# Patient Record
Sex: MALE | Race: WHITE | URBAN - METROPOLITAN AREA
[De-identification: names, ages, dates, MRNs, and addresses within clinical notes are randomized per-mention and may not be internally consistent; named-entity substitution may affect disease eponyms.]

---

## 2021-03-29 ENCOUNTER — TRANSFERRED RECORDS (OUTPATIENT)
Dept: HEALTH INFORMATION MANAGEMENT | Facility: CLINIC | Age: 61
End: 2021-03-29

## 2021-03-29 LAB
CREAT SERPL-MCNC: 0.73 MG/DL (ref 0.57–1.11)
GFR SERPL CREATININE-BSD FRML MDRD: >60 ML/MIN/1.73M2
GLUCOSE SERPL-MCNC: 84 MG/DL (ref 65–100)
POTASSIUM SERPL-SCNC: 3.8 MMOL/L (ref 3.5–5)

## 2021-03-31 ENCOUNTER — TRANSFERRED RECORDS (OUTPATIENT)
Dept: HEALTH INFORMATION MANAGEMENT | Facility: CLINIC | Age: 61
End: 2021-03-31

## 2021-04-01 ENCOUNTER — TRANSFERRED RECORDS (OUTPATIENT)
Dept: HEALTH INFORMATION MANAGEMENT | Facility: CLINIC | Age: 61
End: 2021-04-01

## 2021-04-02 ENCOUNTER — TELEPHONE (OUTPATIENT)
Dept: OTOLARYNGOLOGY | Facility: CLINIC | Age: 61
End: 2021-04-02
Payer: COMMERCIAL

## 2021-04-02 ENCOUNTER — TRANSCRIBE ORDERS (OUTPATIENT)
Dept: OTHER | Age: 61
End: 2021-04-02

## 2021-04-02 DIAGNOSIS — J39.2 PHARYNGEAL MASS: Primary | ICD-10-CM

## 2021-04-02 NOTE — TELEPHONE ENCOUNTER
[facility] Call Center    Phone Message    May a detailed message be left on voicemail: no     Reason for Call: Appointment Intake    Referring Provider Name: Dr. Isidro Walls at [facility] to Head & Neck Cancer surgeon  Diagnosis and/or Symptoms: Left Pharyngeal Mass    Patient will hand carry recent CT imaging    Action Taken: Message routed to:  [facility] ([facility]): [facility] [[id number]]    Travel Screening: Not Applicable

## 2021-04-05 ENCOUNTER — TELEPHONE (OUTPATIENT)
Dept: OTOLARYNGOLOGY | Facility: CLINIC | Age: 61
End: 2021-04-05

## 2021-04-05 NOTE — TELEPHONE ENCOUNTER
Records Requested  04/05/21    [facility] imaging   Fax [phone]   Outcome Sent a fax for 3/31/2021 CT Neck images, report already scanned in EPIC   4/6/2021 11AM images received in PACS - amay       Records Requested  04/05/21    [facility] / [facility]    Outcome Sent a req for ENT notes    4/6/2021 12:16PM spoke with patient, he has provided verbal consent to access his care everywhere recs during this time. - Amay

## 2021-04-06 NOTE — TELEPHONE ENCOUNTER
FUTURE VISIT INFORMATION      FUTURE VISIT INFORMATION:    Date: 4/14/2021    Time: 1:20PM    Location: [facility]  REFERRAL INFORMATION:    Referring provider:  Dr Isidro Walls     Referring providers clinic:      Reason for visit/diagnosis  Head & Neck Cancer surgeon for Left Pharyngeal Mass, referred by Dr. Isidro Walls at [facility]    RECORDS REQUESTED FROM:       Clinic name Comments Records Status Imaging Status   [facility] imaging  3/31/2021 CT Neck Scanned in Epic PACS   [facility]   04/01/2021 note from Isidro Walls, DO   Care Everywhere     [facility]  03/29/2021 note from Epifanio Tena, DO  Care everywhere                   4/6/2021 12PM sent a 2nd fax to [facility] for recs - Amay   *called patient was able to get verbal consent from patient to access his care everywhere records from [facility]. Also notified patient that we do not need him to hand carry his images to the appointment - Amay
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
## 2021-04-14 ENCOUNTER — OFFICE VISIT (OUTPATIENT)
Dept: OTOLARYNGOLOGY | Facility: CLINIC | Age: 61
End: 2021-04-14
Payer: COMMERCIAL

## 2021-04-14 ENCOUNTER — PRE VISIT (OUTPATIENT)
Dept: OTOLARYNGOLOGY | Facility: CLINIC | Age: 61
End: 2021-04-14

## 2021-04-14 VITALS — BODY MASS INDEX: 39.65 KG/M2 | WEIGHT: 277 LBS | HEIGHT: 70 IN

## 2021-04-14 DIAGNOSIS — J39.2 PHARYNGEAL LESION: Primary | ICD-10-CM

## 2021-04-14 DIAGNOSIS — J39.2 PHARYNGEAL MASS: ICD-10-CM

## 2021-04-14 PROCEDURE — 99203 OFFICE O/P NEW LOW 30 MIN: CPT | Mod: 25 | Performed by: OTOLARYNGOLOGY

## 2021-04-14 PROCEDURE — 31575 DIAGNOSTIC LARYNGOSCOPY: CPT | Performed by: OTOLARYNGOLOGY

## 2021-04-14 RX ORDER — GINKGO BILOBA LEAF EXTRACT 60 MG
1 CAPSULE ORAL
COMMUNITY
Start: 2019-08-18

## 2021-04-14 RX ORDER — MULTIVITAMIN
TABLET ORAL
COMMUNITY

## 2021-04-14 RX ORDER — FLUTICASONE PROPIONATE 50 MCG
SPRAY, SUSPENSION (ML) NASAL
COMMUNITY
Start: 2019-08-18

## 2021-04-14 ASSESSMENT — MIFFLIN-ST. JEOR: SCORE: 2077.71

## 2021-04-14 ASSESSMENT — PAIN SCALES - GENERAL: PAINLEVEL: NO PAIN (1)

## 2021-04-14 NOTE — NURSING NOTE
"Chief Complaint   Patient presents with     Consult       Height 1.778 m (5' 10\"), weight 125.6 kg (277 lb).    Cuca Graham, EMT    "
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
## 2021-04-16 ENCOUNTER — TUMOR CONFERENCE (OUTPATIENT)
Dept: ONCOLOGY | Facility: CLINIC | Age: 61
End: 2021-04-16
Payer: COMMERCIAL

## 2021-04-16 DIAGNOSIS — R07.0 THROAT DISCOMFORT: Primary | ICD-10-CM

## 2021-04-16 NOTE — PROGRESS NOTES
Called and spoke with patient with tumor board discussion and recommendation for repeat CT scan in 3 months with follow-up with Dr. Slater. Patient in agreement with plan and was encouraged to call sooner with any further questions or concerns.     Schedulers will call patient to arrange follow-up and CT.    Adelina Mcgraw, RN, BSN

## 2021-04-16 NOTE — PROGRESS NOTES
"Head & Neck Tumor Conference Note        Status: New   Staff: Dr. Slater     Tumor Site: None   Tumor Pathology: Unknown  Tumor Stage: None   Tumor Treatment: None     Reason for Review: Review imaging, and POC    Brief History: Chance Barr is a 60 year old man referred for evaluation of throat pain. The patient has a several week history of left throat pain. He has pain that extends in the lateral neck, along the jaw, into the shoulder. He had a CT neck performed which showed \"thickening of the oropharyngeal mucosa, consistent with a history of chronic pharyngitis\". He saw Dr Walls on 4/1/2021 with no obvious findings. On exam in ENT clinic he had symmetrically enlarged tonsils but no obvious lesions. Imaging demonstrated a prominent level II node. He is being presented today for review of imaging and discussion on the need for biopsy.     Pertinent PMH: No past medical history on file.     Smoking Hx:   Social History     Tobacco Use     Smoking status: Not on file   Substance Use Topics     Alcohol use: Not on file     Drug use: Not on file     Imaging:   CT neck 3/31/21   FINDINGS:     There is thickening of the oropharyngeal mucosa, consistent with the history of chronic pharyngitis. The airway remains patent.    There is no evidence of abscess in the neck.    There are scattered cervical lymph nodes, likely reactive.     The visualized intracranial contents are unremarkable.    The visualized lung apices are unremarkable.    The thyroid gland is unremarkable.    The cervical spine is unremarkable.     IMPRESSION:    Thickening of the oropharyngeal mucosa is consistent with the history of chronic pharyngitis. The airway remains patent.  Please note that all CT scans at this facility use dose modulation, iterative reconstruction, and/or weight-based dosing when appropriate to reduce radiation dose to as low as reasonably achievable.    Pathology:   None     Tumor Board Recommendation:   Discussion: This patients " imaging was reviewed at conference today. His CT scan demonstrates bilateral tonsil enlargement favoring a reactive process. There are a few nodes in the neck are non concerning at this time. It was the recommendation of the tumor board for follow up in 3 months with repeat CT.     Tru Gaspar MD PGY-3  Otolaryngology- Head and Neck Surgery    Documentation / Disclaimer Cancer Tumor Board Note  Cancer tumor board recommendations do not override what is determined to be reasonable care and treatment, which is dependent on the circumstances of a patient's case; the patient's medical, social, and personal concerns; and the clinical judgment of the oncologist [physician].

## 2021-04-19 ENCOUNTER — TELEPHONE (OUTPATIENT)
Dept: OTOLARYNGOLOGY | Facility: CLINIC | Age: 61
End: 2021-04-19

## 2021-07-18 NOTE — PROGRESS NOTES
"Dear Dr. Walls:    I had the pleasure of seeing Chance Barr in follow-up today at the [facility].     History of Present Illness:   Chance Barr is a 60 year old man who was referred in April 2021 for evaluation of throat pain. The patient had a several week history of left throat pain that extended into the lateral neck, along the jaw, into the shoulder. He had a CT neck performed which showed \"thickening of the oropharyngeal mucosa, consistent with a history of chronic pharyngitis\". He saw Dr Walls on 4/1/2021 with no obvious findings. On exam at his initial consult he had no obvious lesions but imaging did show a prominent level II node. His case was reviewed at tumor board with bilateral tonsil enlargement favoring reactive process and nonconcerning nodes. He was recommended for repeat imaging in 3 months.    Interval history:  He comes in today with repeat scan. He says the sore throat part has gone away. He had some discomfort in the side of his tongue recently that felt similar to biting it. He has continued neck pain that radiates into the shoulder. He has some irritation occasionally in the throat which he describes as under the mandible on the left. The pain he primarily describes is in the lateral neck and along the trapezius. He says his eating and drinking is normal. He has no weight loss. He has no choking. He has no voice changes. He has no breathing problems. He has ringing in the ears but no pain. He has a history of noise exposure at a previous job. He notices his hearing is down on the right. He has not had a hearing test for a long time. He does snore at night.        MEDICATIONS:     Current Outpatient Medications   Medication Sig Dispense Refill     fluticasone (FLONASE) 50 MCG/ACT nasal spray Inhale 1-2 sprays in each nostril once daily as needed for congestion.       Green Tea 150 MG CAPS Take 1 capsule by mouth       Multiple Vitamin (MULTI VITAMIN DAILY) " TABS take 1 tablet by oral route  every day with food       potassium aminobenzoate 500 MG CAPS capsule        Prenatal Vit-Fe Fumarate-FA (PRENATAL MULTIVITAMIN  PLUS IRON) 27-1 MG TABS Take by mouth daily         ALLERGIES:    Allergies   Allergen Reactions     Penicillins Other (See Comments)     HUT Comment: Penicillins  Penicillins         HABITS/SOCIAL HISTORY:   Quit smoking in 1976, teenage years. Years ago used chewing tobacco. Occasional alcohol, last 3-4 months ago.   Live with wife and son.   Works as .    Social History     Socioeconomic History     Marital status:      Spouse name: Not on file     Number of children: Not on file     Years of education: Not on file     Highest education level: Not on file   Occupational History     Not on file   Tobacco Use     Smoking status: Not on file   Substance and Sexual Activity     Alcohol use: Not on file     Drug use: Not on file     Sexual activity: Not on file   Other Topics Concern     Not on file   Social History Narrative     Not on file     Social Determinants of Health     Financial Resource Strain:      Difficulty of Paying Living Expenses:    Food Insecurity:      Worried About Running Out of Food in the Last Year:      Ran Out of Food in the Last Year:    Transportation Needs:      Lack of Transportation (Medical):      Lack of Transportation (Non-Medical):    Physical Activity:      Days of Exercise per Week:      Minutes of Exercise per Session:    Stress:      Feeling of Stress :    Social Connections:      Frequency of Communication with Friends and Family:      Frequency of Social Gatherings with Friends and Family:      Attends Christianity Services:      Active Member of Clubs or Organizations:      Attends Club or Organization Meetings:      Marital Status:    Intimate Partner Violence:      Fear of Current or Ex-Partner:      Emotionally Abused:      Physically Abused:      Sexually Abused:        PAST MEDICAL HISTORY: No  "past medical history on file.     PAST SURGICAL HISTORY: No past surgical history on file.    FAMILY HISTORY:  No family history on file.    REVIEW OF SYSTEMS:  12 point ROS was negative other than the symptoms noted above in the HPI.  Patient Supplied Answers to Review of Systems  No flowsheet data found.      PHYSICAL EXAMINATION:   BP (!) 177/103 (BP Location: Left arm, Patient Position: Sitting, Cuff Size: Adult Large)   Pulse 68   Temp 97.9  F (36.6  C) (Temporal)   Ht 1.778 m (5' 10\")   Wt 127 kg (279 lb 15.8 oz)   SpO2 98%   BMI 40.17 kg/m     Appearance:   normal; NAD, age-appropriate appearance, well-developed, obese   Communication:   normal; communicates verbally, normal voice quality   Head/Face:   inspection -  Normal; no scars or visible lesions   Salivary glands -  Normal size, no tenderness, swelling, or palpable masses   Facial strength -  Normal and symmetric   Skin:  normal, no rash   Ocular Motility:  right palsy   Ears:  auricle (AD) -  normal  EAC (AD) -  normal  TM (AD) -  Normal, no effusion  auricle (AS) -  normal  EAC (AS) -  normal  TM (AS) -  Normal, no effusion  Normal clinical speech reception   Nose:  Ext. inspection -  Normal  Internal Inspection -  Normal mucosa, septum, and turbinates   Nasopharynx:  normal mucosa, no masses  retropalatal collapse   Oral Cavity:  lips -  Normal mucosa, oral competence, and stoma size   few missing teeth, teeth with exposed enamel, healthy gingival mucosa   Hard palate, buccal, floor of mouth mucosa normal   Tongue - normal movement, no lesions   Oropharynx:  mucosa -  Normal, no lesions  soft palate -  Normal, no lesions, no asymmetry, normal elevation  tonsils -  no exudates, no abnormal lesions, symmetric, no palpable masses, bilaterally enlarged  Retropalatal collapse  BOT - normal  Vallecula - normal   Hypopharynx:  Normal pyriform sinus and pharyngeal wall mucosa   No pooled secretions    Larynx:  Epiglottis, AE folds, false vocal cords, " true vocal cords, arytenoids normal in appearance, bilaterally mobile cords    Neck: No visible mass or asymmetry    Lymphatic:  no abnormal nodes   Cardiovascular:  warm, pink, well-perfused extremities    Respiratory:  Normal respiratory effort, no stridor   Neuro/Psych.:  mood/affect -  normal  mental status -  normal     PROCEDURES:   Flexible fiberoptic laryngoscopy: Scope exam was indicated due to throat pain. Verbal consent was obtained. The nasal cavity was prepped with an aerosolized solution of topical anesthetic and vasoconstrictive agent. The scope was passed through the anterior nasal cavity and advanced. Inspection of the nasopharynx revealed no gross abnormality. The tonsils are prominent bilaterally but symmetric. There is retropalatal collapse with prominent tonsils bilaterally. The base of tongue and vallecula are normal. The epiglottis, AE folds, false cords, true cords, arytenoids are normal. Inspection of the larynx revealed bilaterally mobile vocal cords. Pyriform sinuses are symmetric. The airway is patent. Procedure tolerated well with no immediate complications noted.      RESULTS REVIEWED:   I independently reviewed the CT scan images    CT neck report reviewed      IMPRESSION AND PLAN:   Chance Barr is a 60-year-old man who was referred in April 2021 for evaluation of throat discomfort that has been present for several months. He had repeat imaging done today.  This does not show any obvious concerning findings.  He did have some questions about the shoulder pain and has concerns because his brother was diagnosed with metastatic lung cancer with similar symptoms.  We discussed that the CT neck does not visualize the shoulder nor does it to visualize the lungs.    He does have issues with tinnitus and hearing loss.  We discussed the option of an audiogram.  He is going to pursue this locally.    He does have a history of snoring and has obvious retropalatal collapse on exam.  He was  encouraged to pursue a sleep study.  He would like to do this locally as well.    He was encouraged to establish care with a primary care physician to help with coordinating the sleep study.    I will see him back if further issues arise.    Thank you very much for the opportunity to participate in the care of your patient.      Karen Slater MD, M.D.  Otolaryngology- Head & Neck Surgery      This note was dictated with voice recognition software and then edited. Please excuse any unintentional errors.       CC:  Isidro Walls MD  [address] [address]

## 2021-07-19 ENCOUNTER — ANCILLARY PROCEDURE (OUTPATIENT)
Dept: CT IMAGING | Facility: CLINIC | Age: 61
End: 2021-07-19
Attending: OTOLARYNGOLOGY
Payer: COMMERCIAL

## 2021-07-19 ENCOUNTER — OFFICE VISIT (OUTPATIENT)
Dept: OTOLARYNGOLOGY | Facility: CLINIC | Age: 61
End: 2021-07-19
Payer: COMMERCIAL

## 2021-07-19 VITALS
TEMPERATURE: 97.9 F | DIASTOLIC BLOOD PRESSURE: 103 MMHG | HEIGHT: 70 IN | BODY MASS INDEX: 40.08 KG/M2 | SYSTOLIC BLOOD PRESSURE: 177 MMHG | WEIGHT: 279.98 LBS | OXYGEN SATURATION: 98 % | HEART RATE: 68 BPM

## 2021-07-19 DIAGNOSIS — R07.0 THROAT DISCOMFORT: ICD-10-CM

## 2021-07-19 DIAGNOSIS — J39.2 PHARYNGEAL LESION: Primary | ICD-10-CM

## 2021-07-19 PROCEDURE — 31575 DIAGNOSTIC LARYNGOSCOPY: CPT | Performed by: OTOLARYNGOLOGY

## 2021-07-19 PROCEDURE — 99214 OFFICE O/P EST MOD 30 MIN: CPT | Mod: 25 | Performed by: OTOLARYNGOLOGY

## 2021-07-19 PROCEDURE — 70491 CT SOFT TISSUE NECK W/DYE: CPT | Mod: GC | Performed by: RADIOLOGY

## 2021-07-19 RX ORDER — IOPAMIDOL 755 MG/ML
100 INJECTION, SOLUTION INTRAVASCULAR ONCE
Status: COMPLETED | OUTPATIENT
Start: 2021-07-19 | End: 2021-07-19

## 2021-07-19 RX ADMIN — IOPAMIDOL 100 ML: 755 INJECTION, SOLUTION INTRAVASCULAR at 08:44

## 2021-07-19 ASSESSMENT — MIFFLIN-ST. JEOR: SCORE: 2091.25

## 2021-07-19 ASSESSMENT — PAIN SCALES - GENERAL: PAINLEVEL: MILD PAIN (2)

## 2021-07-19 NOTE — LETTER
"7/19/2021       RE: Chance Barr  [address] [facility] [address]     Dear Colleague,    Thank you for referring your patient, Chance Barr, to the [facility] [place] at [facility]. Please see a copy of my visit note below.    Dear Dr. Walls:    I had the pleasure of seeing Chance Barr in follow-up today at the [facility].     History of Present Illness:   Chance Barr is a 60 year old man who was referred in April 2021 for evaluation of throat pain. The patient had a several week history of left throat pain that extended into the lateral neck, along the jaw, into the shoulder. He had a CT neck performed which showed \"thickening of the oropharyngeal mucosa, consistent with a history of chronic pharyngitis\". He saw Dr Walls on 4/1/2021 with no obvious findings. On exam at his initial consult he had no obvious lesions but imaging did show a prominent level II node. His case was reviewed at tumor board with bilateral tonsil enlargement favoring reactive process and nonconcerning nodes. He was recommended for repeat imaging in 3 months.    Interval history:  He comes in today with repeat scan. He says the sore throat part has gone away. He had some discomfort in the side of his tongue recently that felt similar to biting it. He has continued neck pain that radiates into the shoulder. He has some irritation occasionally in the throat which he describes as under the mandible on the left. The pain he primarily describes is in the lateral neck and along the trapezius. He says his eating and drinking is normal. He has no weight loss. He has no choking. He has no voice changes. He has no breathing problems. He has ringing in the ears but no pain. He has a history of noise exposure at a previous job. He notices his hearing is down on the right. He has not had a hearing test for a long time. He does " snore at night.        MEDICATIONS:     Current Outpatient Medications   Medication Sig Dispense Refill     fluticasone (FLONASE) 50 MCG/ACT nasal spray Inhale 1-2 sprays in each nostril once daily as needed for congestion.       Green Tea 150 MG CAPS Take 1 capsule by mouth       Multiple Vitamin (MULTI VITAMIN DAILY) TABS take 1 tablet by oral route  every day with food       potassium aminobenzoate 500 MG CAPS capsule        Prenatal Vit-Fe Fumarate-FA (PRENATAL MULTIVITAMIN  PLUS IRON) 27-1 MG TABS Take by mouth daily         ALLERGIES:    Allergies   Allergen Reactions     Penicillins Other (See Comments)     HUT Comment: Penicillins  Penicillins         HABITS/SOCIAL HISTORY:   Quit smoking in 1976, teenage years. Years ago used chewing tobacco. Occasional alcohol, last 3-4 months ago.   Live with wife and son.   Works as .    Social History     Socioeconomic History     Marital status:      Spouse name: Not on file     Number of children: Not on file     Years of education: Not on file     Highest education level: Not on file   Occupational History     Not on file   Tobacco Use     Smoking status: Not on file   Substance and Sexual Activity     Alcohol use: Not on file     Drug use: Not on file     Sexual activity: Not on file   Other Topics Concern     Not on file   Social History Narrative     Not on file     Social Determinants of Health     Financial Resource Strain:      Difficulty of Paying Living Expenses:    Food Insecurity:      Worried About Running Out of Food in the Last Year:      Ran Out of Food in the Last Year:    Transportation Needs:      Lack of Transportation (Medical):      Lack of Transportation (Non-Medical):    Physical Activity:      Days of Exercise per Week:      Minutes of Exercise per Session:    Stress:      Feeling of Stress :    Social Connections:      Frequency of Communication with Friends and Family:      Frequency of Social Gatherings with Friends and  "Family:      Attends Yarsani Services:      Active Member of Clubs or Organizations:      Attends Club or Organization Meetings:      Marital Status:    Intimate Partner Violence:      Fear of Current or Ex-Partner:      Emotionally Abused:      Physically Abused:      Sexually Abused:        PAST MEDICAL HISTORY: No past medical history on file.     PAST SURGICAL HISTORY: No past surgical history on file.    FAMILY HISTORY:  No family history on file.    REVIEW OF SYSTEMS:  12 point ROS was negative other than the symptoms noted above in the HPI.  Patient Supplied Answers to Review of Systems  No flowsheet data found.      PHYSICAL EXAMINATION:   BP (!) 177/103 (BP Location: Left arm, Patient Position: Sitting, Cuff Size: Adult Large)   Pulse 68   Temp 97.9  F (36.6  C) (Temporal)   Ht 1.778 m (5' 10\")   Wt 127 kg (279 lb 15.8 oz)   SpO2 98%   BMI 40.17 kg/m     Appearance:   normal; NAD, age-appropriate appearance, well-developed, obese   Communication:   normal; communicates verbally, normal voice quality   Head/Face:   inspection -  Normal; no scars or visible lesions   Salivary glands -  Normal size, no tenderness, swelling, or palpable masses   Facial strength -  Normal and symmetric   Skin:  normal, no rash   Ocular Motility:  right palsy   Ears:  auricle (AD) -  normal  EAC (AD) -  normal  TM (AD) -  Normal, no effusion  auricle (AS) -  normal  EAC (AS) -  normal  TM (AS) -  Normal, no effusion  Normal clinical speech reception   Nose:  Ext. inspection -  Normal  Internal Inspection -  Normal mucosa, septum, and turbinates   Nasopharynx:  normal mucosa, no masses  retropalatal collapse   Oral Cavity:  lips -  Normal mucosa, oral competence, and stoma size   few missing teeth, teeth with exposed enamel, healthy gingival mucosa   Hard palate, buccal, floor of mouth mucosa normal   Tongue - normal movement, no lesions   Oropharynx:  mucosa -  Normal, no lesions  soft palate -  Normal, no lesions, no " asymmetry, normal elevation  tonsils -  no exudates, no abnormal lesions, symmetric, no palpable masses, bilaterally enlarged  Retropalatal collapse  BOT - normal  Vallecula - normal   Hypopharynx:  Normal pyriform sinus and pharyngeal wall mucosa   No pooled secretions    Larynx:  Epiglottis, AE folds, false vocal cords, true vocal cords, arytenoids normal in appearance, bilaterally mobile cords    Neck: No visible mass or asymmetry    Lymphatic:  no abnormal nodes   Cardiovascular:  warm, pink, well-perfused extremities    Respiratory:  Normal respiratory effort, no stridor   Neuro/Psych.:  mood/affect -  normal  mental status -  normal     PROCEDURES:   Flexible fiberoptic laryngoscopy: Scope exam was indicated due to throat pain. Verbal consent was obtained. The nasal cavity was prepped with an aerosolized solution of topical anesthetic and vasoconstrictive agent. The scope was passed through the anterior nasal cavity and advanced. Inspection of the nasopharynx revealed no gross abnormality. The tonsils are prominent bilaterally but symmetric. There is retropalatal collapse with prominent tonsils bilaterally. The base of tongue and vallecula are normal. The epiglottis, AE folds, false cords, true cords, arytenoids are normal. Inspection of the larynx revealed bilaterally mobile vocal cords. Pyriform sinuses are symmetric. The airway is patent. Procedure tolerated well with no immediate complications noted.      RESULTS REVIEWED:   I independently reviewed the CT scan images    CT neck report reviewed      IMPRESSION AND PLAN:   Chance Barr is a 60-year-old man who was referred in April 2021 for evaluation of throat discomfort that has been present for several months. He had repeat imaging done today.  This does not show any obvious concerning findings.  He did have some questions about the shoulder pain and has concerns because his brother was diagnosed with metastatic lung cancer with similar symptoms.  We  discussed that the CT neck does not visualize the shoulder nor does it to visualize the lungs.    He does have issues with tinnitus and hearing loss.  We discussed the option of an audiogram.  He is going to pursue this locally.    He does have a history of snoring and has obvious retropalatal collapse on exam.  He was encouraged to pursue a sleep study.  He would like to do this locally as well.    He was encouraged to establish care with a primary care physician to help with coordinating the sleep study.    I will see him back if further issues arise.    Thank you very much for the opportunity to participate in the care of your patient.      Karen Slater MD, M.D.  Otolaryngology- Head & Neck Surgery      This note was dictated with voice recognition software and then edited. Please excuse any unintentional errors.       CC:  Isidro Walls MD  [address] [address]

## 2021-07-21 ENCOUNTER — PATIENT OUTREACH (OUTPATIENT)
Dept: OTOLARYNGOLOGY | Facility: CLINIC | Age: 61
End: 2021-07-21

## 2021-07-22 NOTE — PROGRESS NOTES
Called patient with the following CT scan results:    Slight interval decrease in previously noted prominent bilateral  tonsils. Unchanged, likely reactive left level 2A lymph node. No other  abnormality noted.  No evident mass within the neck.    Advised patient that Dr. Slater would recommend follow-up as needed. He was encouraged to call with further questions or concerns. Patient verbalize understanding and has direct line for return call if needed.     Adelina Mcgraw, RN, BSN

## 2024-06-04 ENCOUNTER — TRANSFERRED RECORDS (OUTPATIENT)
Dept: HEALTH INFORMATION MANAGEMENT | Facility: CLINIC | Age: 64
End: 2024-06-04

## 2024-06-04 ENCOUNTER — MEDICAL CORRESPONDENCE (OUTPATIENT)
Dept: HEALTH INFORMATION MANAGEMENT | Facility: CLINIC | Age: 64
End: 2024-06-04

## 2024-06-06 ENCOUNTER — TRANSCRIBE ORDERS (OUTPATIENT)
Dept: OTHER | Age: 64
End: 2024-06-06

## 2024-06-06 DIAGNOSIS — M48.061 FORAMINAL STENOSIS OF LUMBAR REGION: Primary | ICD-10-CM

## 2024-06-06 DIAGNOSIS — G62.9 PERIPHERAL NEUROPATHY: ICD-10-CM

## 2024-06-06 DIAGNOSIS — M43.10 ISTHMIC SPONDYLOLISTHESIS: ICD-10-CM
